# Patient Record
Sex: MALE | Race: WHITE | Employment: OTHER | URBAN - METROPOLITAN AREA
[De-identification: names, ages, dates, MRNs, and addresses within clinical notes are randomized per-mention and may not be internally consistent; named-entity substitution may affect disease eponyms.]

---

## 2018-05-26 ENCOUNTER — HOSPITAL ENCOUNTER (EMERGENCY)
Facility: CLINIC | Age: 78
Discharge: HOME | End: 2018-05-26
Attending: EMERGENCY MEDICINE | Admitting: EMERGENCY MEDICINE
Payer: MEDICARE

## 2018-05-26 VITALS
BODY MASS INDEX: 36.02 KG/M2 | OXYGEN SATURATION: 93 % | DIASTOLIC BLOOD PRESSURE: 80 MMHG | HEART RATE: 52 BPM | RESPIRATION RATE: 16 BRPM | SYSTOLIC BLOOD PRESSURE: 132 MMHG | WEIGHT: 230 LBS | TEMPERATURE: 98 F
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
## 2018-05-26 PROCEDURE — 99284 EMERGENCY DEPT VISIT MOD MDM: CPT | Mod: Z6 | Performed by: EMERGENCY MEDICINE

## 2018-05-26 PROCEDURE — A9270 NON-COVERED ITEM OR SERVICE: HCPCS | Mod: GY | Performed by: EMERGENCY MEDICINE

## 2018-05-26 PROCEDURE — 25000132 ZZH RX MED GY IP 250 OP 250 PS 637: Mod: GY | Performed by: EMERGENCY MEDICINE

## 2018-05-26 PROCEDURE — 99283 EMERGENCY DEPT VISIT LOW MDM: CPT | Performed by: EMERGENCY MEDICINE

## 2018-05-26 RX ORDER — DOXYCYCLINE 100 MG/1
200 CAPSULE ORAL ONCE
Status: COMPLETED | OUTPATIENT
Start: 2018-05-26 | End: 2018-05-26

## 2018-05-26 RX ADMIN — DOXYCYCLINE HYCLATE 200 MG: 100 CAPSULE, GELATIN COATED ORAL at 09:43

## 2018-05-26 ASSESSMENT — ENCOUNTER SYMPTOMS
ENDOCRINE NEGATIVE: 1
PSYCHIATRIC NEGATIVE: 1
GASTROINTESTINAL NEGATIVE: 1
ALLERGIC/IMMUNOLOGIC NEGATIVE: 1
NEUROLOGICAL NEGATIVE: 1
MUSCULOSKELETAL NEGATIVE: 1
CARDIOVASCULAR NEGATIVE: 1
EYES NEGATIVE: 1
RESPIRATORY NEGATIVE: 1
HEMATOLOGIC/LYMPHATIC NEGATIVE: 1

## 2018-05-26 NOTE — DISCHARGE INSTRUCTIONS
"  Tick Bites  Ticks are small arachnids that feed on the blood of rodents, rabbits, birds, deer, dogs, and people. A tick bite may cause a reaction like a spider bite. You may have redness, itching, and slight swelling at the site. Sometimes you may have no reaction where the tick bit you.  Ticks may gorge themselves for days before you find and remove them. The bites themselves aren't cause for concern. But ticks can carry and pass on illnesses such as Lyme disease and Luis[place] Mountain spotted fever. Both diseases begin with a rash and symptoms similar to the flu. In advanced stages, these diseases can be quite serious.     A \"bull's eye\" rash is a common symptom of Lyme disease.   When to go to the emergency room (ER)  Not all ticks carry disease. And a tick must stay attached for at least 24 hours to infect you. If you find a tick, don't panic. Try to carefully remove it with tweezers. Grasp the tick near its head and pull without twisting. If you can't easily dislodge the tick or if you leave the head in your skin, get medical care right away.  What to expect in the ER    The tick or any parts of the tick will be removed and the bite will be cleaned.    To prevent disease, you may be given antibiotics. Both Lyme disease and Luis[place] Mountain spotted fever respond quickly to these medicines.    You may be asked to see your healthcare provider for a blood test to check for Lyme disease.  Follow-up care  Some states and [facility] have services that test ticks for Lyme disease and other diseases. Check with your local officials to see if this service is available in your area.  If you remove a tick yourself, watch for signs of a tick-borne illness. Symptoms may show up within a few days or weeks after a bite. Call your healthcare provider if you notice any of the following:    Rash. The rash may spread outward in a ring from a hard white lump. Or it may move up your arms and legs to your chest.    Chills and fever    Body " aches and joint pain    Severe headache  Date Last Reviewed: 12/1/2016 2000-2017 [employer]. [address] [place] [address], [place] [address]. All rights reserved. This information is not intended as a substitute for professional medical care. Always follow your healthcare professional's instructions.

## 2018-05-26 NOTE — ED AVS SNAPSHOT
" [facility] Emergency Department    [address] [facility] [address]    Phone:  [phone]    Fax:  [phone]                                       Dannie Smyth   MRN: [id number]    Department:  [facility] Emergency Department   Date of Visit:  5/26/2018           Patient Information     Date Of Birth          1940        Your diagnoses for this visit were:     Tick bite, initial encounter Remove 3 total ticks in the last 24 hours over left lower leg and left buttock area.       You were seen by Maximiliano Arnett MD.      Follow-up Information     Follow up with [facility] Emergency Department.    Specialty:  EMERGENCY MEDICINE    Why:  If symptoms worsen, As needed-or if you have any new concerns.  It is helpful to remove the tick and keep them so we can identify them and differentiate a wood tick from a deer to    Contact information:    [address] [place] [id number] [phone]    Additional information:    The medical center is located at   [address] [place] [address] (between [facility] and   [place] [address] in [place], [place] miles north   of [place]).        Discharge Instructions         Tick Bites  Ticks are small arachnids that feed on the blood of rodents, rabbits, birds, deer, dogs, and people. A tick bite may cause a reaction like a spider bite. You may have redness, itching, and slight swelling at the site. Sometimes you may have no reaction where the tick bit you.  Ticks may gorge themselves for days before you find and remove them. The bites themselves aren't cause for concern. But ticks can carry and pass on illnesses such as Lyme disease and Luis[place] Mountain spotted fever. Both diseases begin with a rash and symptoms similar to the flu. In advanced stages, these diseases can be quite serious.     A \"bull's eye\" rash is a common symptom of Lyme disease.   When to go to the emergency room (ER)  Not all ticks carry disease. And a tick must stay attached for " at least 24 hours to infect you. If you find a tick, don't panic. Try to carefully remove it with tweezers. Grasp the tick near its head and pull without twisting. If you can't easily dislodge the tick or if you leave the head in your skin, get medical care right away.  What to expect in the ER    The tick or any parts of the tick will be removed and the bite will be cleaned.    To prevent disease, you may be given antibiotics. Both Lyme disease and [place] Mountain spotted fever respond quickly to these medicines.    You may be asked to see your healthcare provider for a blood test to check for Lyme disease.  Follow-up care  Some states and [facility] have services that test ticks for Lyme disease and other diseases. Check with your local officials to see if this service is available in your area.  If you remove a tick yourself, watch for signs of a tick-borne illness. Symptoms may show up within a few days or weeks after a bite. Call your healthcare provider if you notice any of the following:    Rash. The rash may spread outward in a ring from a hard white lump. Or it may move up your arms and legs to your chest.    Chills and fever    Body aches and joint pain    Severe headache  Date Last Reviewed: 12/1/2016 2000-2017 [employer]. [address] [place] [address], [place] [address]. All rights reserved. This information is not intended as a substitute for professional medical care. Always follow your healthcare professional's instructions.          Discharge References/Attachments     DOXYCYCLINE HYCLATE ORAL TABLET (ENGLISH)      24 Hour Appointment Hotline       To make an appointment at any [facility] clinic, call [phone] ([phone]). If you don't have a family doctor or clinic, we will help you find one. [facility] clinics are conveniently located to serve the needs of you and your family.             Review of your medicines      Our records show that you are taking the medicines listed below.  If these are incorrect, please call your family doctor or clinic.        Dose / Directions Last dose taken    aspirin 81 MG tablet   Quantity:  100        ONE DAILY   Refills:  3        Butalbital-Acetaminophen  MG Tabs per tablet   Commonly known as:  PHRENILIN        1 TABLET EVERY 4 HOURS AS NEEDED  max of 4 per day   Refills:  0        CRESTOR 20 MG tablet   Generic drug:  rosuvastatin        1 TABLET DAILY   Refills:  0        diazepam 5 MG tablet   Commonly known as:  VALIUM   Quantity:  60        1 TABLET every 6 hours as needed   Refills:  0        doxycycline 100 MG capsule   Commonly known as:  VIBRAMYCIN   Dose:  100 mg   Quantity:  28 capsule        Take 1 capsule by mouth 2 times daily.   Refills:  0        * FLOMAX 0.4 MG 24 hr capsule   Quantity:  3 MONTHS   Generic drug:  tamsulosin        ONE DAILY   Refills:  3        * FLOMAX 0.4 MG 24 hr capsule   Quantity:  90   Generic drug:  tamsulosin        ONE DAILY   Refills:  1        LIPITOR PO        Take  by mouth.   Refills:  0        lisinopril 5 MG tablet   Commonly known as:  PRINIVIL/ZESTRIL        ONE DAILY   Refills:  3        metoprolol tartrate 25 MG tablet   Commonly known as:  LOPRESSOR        1 TABLET 2TIMES DAILY   Refills:  0        nexIUM 40 MG CR capsule   Generic drug:  esomeprazole        ONE DAILY   Refills:  3        nitroGLYcerin 0.4 MG sublingual tablet   Commonly known as:  NITROSTAT   Quantity:  1 BOTTLE        ONE TABLET UNDER TONGUE, FOR CHEST PAIN, AS DIRECTED   Refills:  3        PLAVIX 75 MG tablet   Generic drug:  clopidogrel        ONE DAILY   Refills:  3        TRICOR 145 MG tablet   Generic drug:  fenofibrate        ONE TABLET DAILY   Refills:  3        ZETIA 10 MG tablet   Generic drug:  ezetimibe        ONE TABLET DAILY   Refills:  3        * Notice:  This list has 2 medication(s) that are the same as other medications prescribed for you. Read the directions carefully, and ask your doctor or other care provider  "to review them with you.            Orders Needing Specimen Collection     None      Pending Results     No orders found from 2018 to 2018.            Pending Culture Results     No orders found from 2018 to 2018.            Pending Results Instructions     If you had any lab results that were not finalized at the time of your Discharge, you can call the ED Lab Result RN at [phone]. You will be contacted by this team for any positive Lab results or changes in treatment. The nurses are available 7 days a week from 10A to 6:30P.  You can leave a message 24 hours per day and they will return your call.        Test Results From Your Hospital Stay               Thank you for choosing [facility]       Thank you for choosing [facility] for your care. Our goal is always to provide you with excellent care. Hearing back from our patients is one way we can continue to improve our services. Please take a few minutes to complete the written survey that you may receive in the mail after you visit with us. Thank you!        [employer] Information     [employer] lets you send messages to your doctor, view your test results, renew your prescriptions, schedule appointments and more. To sign up, go to [email or web address] . Click on \"Log in\" on the left side of the screen, which will take you to the Welcome page. Then click on \"Sign up Now\" on the right side of the page.     You will be asked to enter the access code listed below, as well as some personal information. Please follow the directions to create your username and password.     Your access code is: [email or web address]  Expires: 2018  9:39 AM     Your access code will  in 90 days. If you need help or a new code, please call your [facility] clinic or [phone].        Care EveryWhere ID     This is your Care EveryWhere ID. This could be used by other organizations to access your [facility] medical records  [id number]        Equal Access to Services     " ESTEBAN BEYER : Hadii kelsey Wong, waaxda luqadaha, qaybta kaalmada shante, myra kiran. So [facility] [phone].    ATENCIÓN: Si habla español, tiene a trammell disposición servicios gratuitos de asistencia lingüística. Llame al [phone].    We comply with applicable federal civil rights laws and [place] laws. We do not discriminate on the basis of race, color, national origin, age, disability, sex, sexual orientation, or gender identity.            After Visit Summary       This is your record. Keep this with you and show to your community pharmacist(s) and doctor(s) at your next visit.

## 2018-05-26 NOTE — ED PROVIDER NOTES
History     Chief Complaint   Patient presents with     Tick Removal     2 deer tick bites     HPI  Dannie Smyth is a 77 year old male with a history of hypertension, GERD and known history of coronary artery disease and hyperlipidemia who presents for evaluation for tick removal.  Patient is followed by Dr. Shah. Patient was concerned about removing 3 ticks over his left leg and buttock area. He arrives for discussion about Lymes disease and antibiotics. He is uncertain if one of the ticks was on him for more than 36 hours.  He recalls he was working on his yard when he initially noted the takes yesterday and this morning.  He reports when he removed the ticks there were not engorged.  He has had no fever or chills he has no rashes.  He does live in an area invested by ticks.      Problem List:    Patient Active Problem List    Diagnosis Date Noted     Hypertension 03/17/2009     Priority: Medium     GERD (gastroesophageal reflux disease) 03/17/2009     Priority: Medium     CAD (coronary artery disease) 03/17/2009     Priority: Medium     Last stredss test 2008 good report.         Hyperlipidemia LDL goal <100 03/17/2009     Priority: Medium     Dysuria 03/17/2009     Priority: Medium        Past Medical History:    No past medical history on file.    Past Surgical History:    Past Surgical History:   Procedure Laterality Date     CABG, ARTERIAL, FOUR+      15 years ago     STENT, CORONARY, S660 15/18      one year ago       Family History:    Family History   Problem Relation Age of Onset     CANCER Mother      stomach ca     Unknown/Adopted Maternal Grandmother      Unknown/Adopted Maternal Grandfather      Unknown/Adopted Paternal Grandmother      Unknown/Adopted Paternal Grandfather      HEART DISEASE Brother      has a pacemaker       Social History:  Marital Status:   [2]  Social History   Substance Use Topics     Smoking status: Never Smoker     Smokeless tobacco: Not on file     Alcohol use  Yes      Comment: 3-4 drinks weekly        Medications:      ASPIRIN 81 MG OR TABS   Atorvastatin Calcium (LIPITOR PO)   BUTALBITAL-ACETAMINOPHEN  MG OR TABS   CRESTOR 20 MG OR TABS   DIAZEPAM 5 MG OR TABS   doxycycline (VIBRAMYCIN) 100 MG capsule   FLOMAX 0.4 MG OR CP24   FLOMAX 0.4 MG OR CP24   LISINOPRIL 5 MG OR TABS   METOPROLOL TARTRATE 25 MG OR TABS   NEXIUM 40 MG OR CPDR   NITROGLYCERIN 0.4 MG SL SUBL   PLAVIX 75 MG OR TABS   TRICOR 145 MG OR TABS   ZETIA 10 MG OR TABS         Review of Systems   Constitutional:        Removed 3 ticks over the last 24 hours   HENT: Negative.    Eyes: Negative.    Respiratory: Negative.    Cardiovascular: Negative.    Gastrointestinal: Negative.    Endocrine: Negative.    Genitourinary: Negative.    Musculoskeletal: Negative.    Skin: Negative.    Allergic/Immunologic: Negative.    Neurological: Negative.    Hematological: Negative.    Psychiatric/Behavioral: Negative.        Physical Exam   BP: 132/80  Pulse: 52  Temp: 98  F (36.7  C)  Resp: 16  Weight: 104.3 kg (230 lb)  SpO2: 93 %      Physical Exam   Constitutional: He is oriented to person, place, and time. He appears well-developed and well-nourished. No distress.   HENT:   Head: Normocephalic and atraumatic.   Eyes: Conjunctivae and EOM are normal. Pupils are equal, round, and reactive to light. Right eye exhibits no discharge. Left eye exhibits no discharge. No scleral icterus.   Cardiovascular: Normal rate and regular rhythm.    Pulmonary/Chest: Effort normal.   Musculoskeletal:        Right knee: He exhibits erythema. He exhibits normal range of motion, no swelling, no effusion, no ecchymosis, no deformity and no laceration.        Legs:  Neurological: He is alert and oriented to person, place, and time. No cranial nerve deficit. Coordination normal.   Skin: No rash noted. He is not diaphoretic. No erythema. No pallor.   Psychiatric: He has a normal mood and affect. His behavior is normal. Judgment and thought  content normal.               ED Course     ED Course     Procedures               Critical Care time:  none               No results found for this or any previous visit (from the past 24 hour(s)).    Medications   doxycycline (VIBRAMYCIN) capsule 200 mg (not administered)       ED medications:  Medications   doxycycline (VIBRAMYCIN) capsule 200 mg (not administered)         ED labs and imaging: none      ED Vitals:  Vitals:    05/26/18 0844   BP: 132/80   Pulse: 52   Resp: 16   Temp: 98  F (36.7  C)   TempSrc: Temporal   SpO2: 93%   Weight: 104.3 kg (230 lb)     Assessments & Plan (with Medical Decision Making)   Clinical impression: 69-year-old male who presented for concern for tick exposure or tick bites.  He removed 3 ticks over the last 24 hours.  He reports he remove one tick on Friday, May 25 and 2  Ticks this morning on Saturday, May 26 one was left patient did bring an example to take he removed this morning.  Please see photo in the physical exam section above.  His tick bite wounds do not appear infected.  And there is no erythema migrans lesions noted on his lower leg and buttock.  Also afebrile and otherwise hemodynamically stable.      ED course and Plan:  Because that the tick he brought into the ED is likely a wood tick however he is unsure about the other 2 tics and does not have physical evidence of the tics in the ED.  He also does not know how long the tick was embedded in his skin.  We discussed the current recommendations for prophylactic treatment in the context of tick exposure particularly for his exposed Lortab for more than 36 hours and if there was any engorgement.  Patient did not report any engorgement with ataxia removed.  He did request treatment I thought it was reasonable he was given one-time dose of doxycycline 200 mg.  Common and serious side effects with his dose of doxycycline was reviewed.        Disclaimer: This note consists of symbols derived from keyboarding, dictation  and/or voice recognition software. As a result, there may be errors in the script that have gone undetected. Please consider this when interpreting information found in this chart.  I have reviewed the nursing notes.    I have reviewed the findings, diagnosis, plan and need for follow up with the patient.       New Prescriptions    No medications on file       Final diagnoses:   Tick bite, initial encounter - Remove 3 total ticks in the last 24 hours over left lower leg and left buttock area.       5/26/2018   [facility] EMERGENCY DEPARTMENT     Maximiliano Arnett MD  05/26/18 5223

## 2018-05-26 NOTE — ED AVS SNAPSHOT
[facility] Emergency Department    [address] [facility] [address]    Phone:  [phone]    Fax:  [phone]                                       Dannie Smyth   MRN: [id number]    Department:  [facility] Emergency Department   Date of Visit:  5/26/2018           After Visit Summary Signature Page     I have received my discharge instructions, and my questions have been answered. I have discussed any challenges I see with this plan with the nurse or doctor.    ..........................................................................................................................................  Patient/Patient Representative Signature      ..........................................................................................................................................  Patient Representative Print Name and Relationship to Patient    ..................................................               ................................................  Date                                            Time    ..........................................................................................................................................  Reviewed by Signature/Title    ...................................................              ..............................................  Date                                                            Time

## 2019-06-04 ENCOUNTER — HOSPITAL ENCOUNTER (EMERGENCY)
Facility: CLINIC | Age: 79
Discharge: HOME | End: 2019-06-04
Attending: NURSE PRACTITIONER | Admitting: NURSE PRACTITIONER
Payer: MEDICARE

## 2019-06-04 VITALS
SYSTOLIC BLOOD PRESSURE: 152 MMHG | HEART RATE: 55 BPM | WEIGHT: 225 LBS | DIASTOLIC BLOOD PRESSURE: 72 MMHG | TEMPERATURE: 98.2 F | RESPIRATION RATE: 18 BRPM | OXYGEN SATURATION: 93 % | HEIGHT: 66 IN | BODY MASS INDEX: 36.16 KG/M2
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
## 2019-06-04 PROCEDURE — G0463 HOSPITAL OUTPT CLINIC VISIT: HCPCS

## 2019-06-04 PROCEDURE — 99213 OFFICE O/P EST LOW 20 MIN: CPT | Mod: Z6 | Performed by: NURSE PRACTITIONER

## 2019-06-04 RX ORDER — METOPROLOL SUCCINATE 25 MG/1
25 TABLET, EXTENDED RELEASE ORAL
COMMUNITY
Start: 2012-07-30

## 2019-06-04 RX ORDER — BUTALBITAL, ASPIRIN AND CAFFEINE 50; 325; 40 MG/1; MG/1; MG/1
1-2 TABLET ORAL
COMMUNITY
Start: 2011-05-16

## 2019-06-04 RX ORDER — FLUTICASONE PROPIONATE 50 MCG
1 SPRAY, SUSPENSION (ML) NASAL
COMMUNITY
Start: 2015-02-16

## 2019-06-04 RX ORDER — DOXYCYCLINE 100 MG/1
200 TABLET ORAL ONCE
Qty: 2 TABLET | Refills: 0 | Status: SHIPPED | OUTPATIENT
Start: 2019-06-04 | End: 2019-06-04

## 2019-06-04 RX ORDER — ISOSORBIDE MONONITRATE 120 MG/1
120 TABLET, EXTENDED RELEASE ORAL
COMMUNITY
Start: 2015-05-31

## 2019-06-04 RX ORDER — ACETAMINOPHEN 500 MG
500 TABLET ORAL
COMMUNITY
Start: 2011-10-14

## 2019-06-04 RX ORDER — SODIUM PHOSPHATE,MONO-DIBASIC 19G-7G/118
1 ENEMA (ML) RECTAL
COMMUNITY
Start: 2012-08-31

## 2019-06-04 RX ORDER — METFORMIN HCL 500 MG
1000 TABLET, EXTENDED RELEASE 24 HR ORAL
COMMUNITY
Start: 2018-09-18

## 2019-06-04 ASSESSMENT — MIFFLIN-ST. JEOR: SCORE: 1678.34

## 2019-06-04 NOTE — ED AVS SNAPSHOT
[facility] Emergency Department  [address] [facility] [address]  Phone:  [phone]  Fax:  [phone]                                    Dannie Smyth   MRN: [id number]    Department:  [facility] Emergency Department   Date of Visit:  6/4/2019           After Visit Summary Signature Page    I have received my discharge instructions, and my questions have been answered. I have discussed any challenges I see with this plan with the nurse or doctor.    ..........................................................................................................................................  Patient/Patient Representative Signature      ..........................................................................................................................................  Patient Representative Print Name and Relationship to Patient    ..................................................               ................................................  Date                                   Time    ..........................................................................................................................................  Reviewed by Signature/Title    ...................................................              ..............................................  Date                                               Time          22EPIC Rev 08/18

## 2019-06-05 ASSESSMENT — ENCOUNTER SYMPTOMS: FEVER: 0

## 2019-06-05 NOTE — ED PROVIDER NOTES
"  History     Chief Complaint   Patient presents with     Tick Removal     tick bite in two places chest and back      HPI  Dannie Smyth is a 78 year old male who presents to urgent care for evaluation of tick bite. Patient reports removing a \"regular wood tick\" from his back and a deer tick from his chest. The tick on his chest was embedded and engorged. He is not sure how long the tick was in place, but thinks it may have been 1-2 days. He removed the wood ticks today.  The deer tick on his chest did not come out all the way and still has a piece of the tick embedded.    Allergies:  No Known Allergies    Problem List:    Patient Active Problem List    Diagnosis Date Noted     Hypertension 03/17/2009     Priority: Medium     GERD (gastroesophageal reflux disease) 03/17/2009     Priority: Medium     CAD (coronary artery disease) 03/17/2009     Priority: Medium     Last stredss test 2008 good report.         Hyperlipidemia LDL goal <100 03/17/2009     Priority: Medium     Dysuria 03/17/2009     Priority: Medium        Past Medical History:    No past medical history on file.    Past Surgical History:    Past Surgical History:   Procedure Laterality Date     CABG, ARTERIAL, FOUR+      15 years ago     STENT, CORONARY, S660 15/18      one year ago       Family History:    Family History   Problem Relation Age of Onset     Cancer Mother         stomach ca     Unknown/Adopted Maternal Grandmother      Unknown/Adopted Maternal Grandfather      Unknown/Adopted Paternal Grandmother      Unknown/Adopted Paternal Grandfather      Heart Disease Brother         has a pacemaker       Social History:  Marital Status:   [2]  Social History     Tobacco Use     Smoking status: Never Smoker   Substance Use Topics     Alcohol use: Yes     Comment: 3-4 drinks weekly     Drug use: No        Medications:      acetaminophen (TYLENOL) 500 MG tablet   butalbital-aspirin-caffeine (FIORINAL EQUIV) -40 MG TABS per tablet " "  doxycycline monohydrate (ADOXA) 100 MG tablet   fluticasone (FLONASE) 50 MCG/ACT nasal spray   glucosamine-chondroitin 500-400 MG CAPS per capsule   isosorbide mononitrate CR (IMDUR) 120 MG 24 HR ER tablet   metFORMIN (GLUCOPHAGE-XR) 500 MG 24 hr tablet   metoprolol succinate ER (TOPROL-XL) 25 MG 24 hr tablet   ASPIRIN 81 MG OR TABS   Atorvastatin Calcium (LIPITOR PO)   BUTALBITAL-ACETAMINOPHEN  MG OR TABS   CRESTOR 20 MG OR TABS   DIAZEPAM 5 MG OR TABS   FLOMAX 0.4 MG OR CP24   FLOMAX 0.4 MG OR CP24   LISINOPRIL 5 MG OR TABS   METOPROLOL TARTRATE 25 MG OR TABS   NEXIUM 40 MG OR CPDR   NITROGLYCERIN 0.4 MG SL SUBL   PLAVIX 75 MG OR TABS   TRICOR 145 MG OR TABS   ZETIA 10 MG OR TABS         Review of Systems   Constitutional: Negative for fever.   Skin:        Tick bites to upper back and upper chest.       Physical Exam   BP: 152/72  Pulse: 55  Temp: 98.2  F (36.8  C)  Resp: 18  Height: 167.6 cm (5' 6\")  Weight: 102.1 kg (225 lb)  SpO2: 93 %      Physical Exam    GENERAL APPEARANCE: healthy, alert and no distress  SKIN:   upper right back tick bite. No surrounding redness.  Upper right chest tick bite with a black spot in center (possibly a piece of the tick embedded). No surrounding redness.    ED Course        Procedures           Cleansed the chest tick bite with alcohol wipe. With an 18 g needle the site was unroofed. Able to remove the piece of wood tick that was embedded. Applied bacitracin and band-aid.    No results found for this or any previous visit (from the past 24 hour(s)).    Medications - No data to display    Assessments & Plan (with Medical Decision Making)   Patient with a deer tick bite. Meets criteria for prophylactic treatment with Doxycyline. Rx for Doxycycline 200 mg x one dose provided. Worrisome reasons to recheck discussed.  I have reviewed the nursing notes.    I have reviewed the findings, diagnosis, plan and need for follow up with the patient.         Medication List    "   Started    doxycycline monohydrate 100 MG tablet  Commonly known as:  ADOXA  200 mg, Oral, ONCE            Final diagnoses:   Tick bite, initial encounter       6/4/2019   [facility] EMERGENCY DEPARTMENT     Evelyn Mosqueda APRN CNP  06/05/19 121

## 2022-07-09 ENCOUNTER — TELEPHONE ENCOUNTER (OUTPATIENT)
Dept: URBAN - METROPOLITAN AREA CLINIC 121 | Facility: CLINIC | Age: 82
End: 2022-07-09

## 2022-07-10 ENCOUNTER — TELEPHONE ENCOUNTER (OUTPATIENT)
Dept: URBAN - METROPOLITAN AREA CLINIC 121 | Facility: CLINIC | Age: 82
End: 2022-07-10